# Patient Record
Sex: FEMALE | Race: WHITE | URBAN - NONMETROPOLITAN AREA
[De-identification: names, ages, dates, MRNs, and addresses within clinical notes are randomized per-mention and may not be internally consistent; named-entity substitution may affect disease eponyms.]

---

## 2017-01-12 RX ORDER — TOPIRAMATE 100 MG/1
TABLET, FILM COATED ORAL
Qty: 60 TABLET | Refills: 1 | Status: SHIPPED | OUTPATIENT
Start: 2017-01-12 | End: 2017-04-02 | Stop reason: SDUPTHER

## 2017-01-16 ENCOUNTER — TELEPHONE (OUTPATIENT)
Dept: NEUROLOGY | Age: 47
End: 2017-01-16

## 2017-02-07 RX ORDER — SUMATRIPTAN 100 MG/1
TABLET, FILM COATED ORAL
Qty: 12 TABLET | Refills: 2 | Status: SHIPPED | OUTPATIENT
Start: 2017-02-07 | End: 2017-03-21 | Stop reason: SDUPTHER

## 2017-03-27 RX ORDER — SUMATRIPTAN 100 MG/1
TABLET, FILM COATED ORAL
Qty: 12 TABLET | Refills: 2 | Status: SHIPPED | OUTPATIENT
Start: 2017-03-27

## 2017-03-27 RX ORDER — SUMATRIPTAN 100 MG/1
TABLET, FILM COATED ORAL
Qty: 12 TABLET | Refills: 0 | Status: SHIPPED | OUTPATIENT
Start: 2017-03-27 | End: 2017-07-13 | Stop reason: SDUPTHER

## 2017-04-03 RX ORDER — TOPIRAMATE 100 MG/1
TABLET, FILM COATED ORAL
Qty: 60 TABLET | Refills: 0 | Status: SHIPPED | OUTPATIENT
Start: 2017-04-03 | End: 2017-07-13 | Stop reason: SDUPTHER

## 2017-04-17 ENCOUNTER — OFFICE VISIT (OUTPATIENT)
Dept: NEUROLOGY | Age: 47
End: 2017-04-17
Payer: COMMERCIAL

## 2017-04-17 VITALS
WEIGHT: 200 LBS | SYSTOLIC BLOOD PRESSURE: 114 MMHG | BODY MASS INDEX: 30.31 KG/M2 | DIASTOLIC BLOOD PRESSURE: 70 MMHG | HEIGHT: 68 IN | HEART RATE: 80 BPM

## 2017-04-17 DIAGNOSIS — E03.9 HYPOTHYROIDISM, UNSPECIFIED TYPE: ICD-10-CM

## 2017-04-17 DIAGNOSIS — G43.909 MIGRAINE WITHOUT STATUS MIGRAINOSUS, NOT INTRACTABLE, UNSPECIFIED MIGRAINE TYPE: ICD-10-CM

## 2017-04-17 DIAGNOSIS — R47.9 SPEECH PROBLEM: ICD-10-CM

## 2017-04-17 DIAGNOSIS — R42 DIZZINESS: ICD-10-CM

## 2017-04-17 DIAGNOSIS — I63.50 UNSPECIFIED CEREBRAL ARTERY OCCLUSION WITH CEREBRAL INFARCTION: ICD-10-CM

## 2017-04-17 DIAGNOSIS — G89.29 CHRONIC NECK AND BACK PAIN: Primary | ICD-10-CM

## 2017-04-17 DIAGNOSIS — N30.10 INTERSTITIAL CYSTITIS: ICD-10-CM

## 2017-04-17 DIAGNOSIS — D68.61 ANTI-CARDIOLIPIN ANTIBODY SYNDROME (HCC): ICD-10-CM

## 2017-04-17 DIAGNOSIS — M54.9 CHRONIC NECK AND BACK PAIN: Primary | ICD-10-CM

## 2017-04-17 DIAGNOSIS — N80.9 ENDOMETRIOSIS: ICD-10-CM

## 2017-04-17 DIAGNOSIS — A69.20 LYME DISEASE: ICD-10-CM

## 2017-04-17 DIAGNOSIS — R26.89 BALANCE PROBLEM: ICD-10-CM

## 2017-04-17 DIAGNOSIS — G45.9 TRANSIENT CEREBRAL ISCHEMIA, UNSPECIFIED TYPE: ICD-10-CM

## 2017-04-17 DIAGNOSIS — G93.0 INTRACRANIAL ARACHNOID CYST: ICD-10-CM

## 2017-04-17 DIAGNOSIS — M54.2 CHRONIC NECK AND BACK PAIN: Primary | ICD-10-CM

## 2017-04-17 DIAGNOSIS — G44.1 OTHER VASCULAR HEADACHE: ICD-10-CM

## 2017-04-17 DIAGNOSIS — E07.9 THYROID DISEASE: ICD-10-CM

## 2017-04-17 DIAGNOSIS — M54.2 CERVICALGIA: ICD-10-CM

## 2017-04-17 DIAGNOSIS — R56.9 CONVULSIONS, UNSPECIFIED CONVULSION TYPE (HCC): ICD-10-CM

## 2017-04-17 DIAGNOSIS — G43.001 MIGRAINE WITHOUT AURA AND WITH STATUS MIGRAINOSUS, NOT INTRACTABLE: ICD-10-CM

## 2017-04-17 DIAGNOSIS — G44.209 TENSION-TYPE HEADACHE, NOT INTRACTABLE, UNSPECIFIED CHRONICITY PATTERN: ICD-10-CM

## 2017-04-17 DIAGNOSIS — M54.2 CERVICAL PAIN: ICD-10-CM

## 2017-04-17 DIAGNOSIS — K22.70 BARRETT'S ESOPHAGUS WITHOUT DYSPLASIA: ICD-10-CM

## 2017-04-17 DIAGNOSIS — F32.89 OTHER DEPRESSION: ICD-10-CM

## 2017-04-17 DIAGNOSIS — Z84.89: ICD-10-CM

## 2017-04-17 DIAGNOSIS — E06.3 HASHIMOTO THYROIDITIS, FIBROUS VARIANT: ICD-10-CM

## 2017-04-17 DIAGNOSIS — G47.9 SLEEP DISTURBANCE: ICD-10-CM

## 2017-04-17 DIAGNOSIS — R41.3 MEMORY PROBLEM: ICD-10-CM

## 2017-04-17 DIAGNOSIS — F17.200 SMOKER: ICD-10-CM

## 2017-04-17 PROCEDURE — 99215 OFFICE O/P EST HI 40 MIN: CPT | Performed by: PSYCHIATRY & NEUROLOGY

## 2017-04-17 RX ORDER — GABAPENTIN 100 MG/1
CAPSULE ORAL
Qty: 90 CAPSULE | Refills: 0 | Status: SHIPPED | OUTPATIENT
Start: 2017-04-17 | End: 2017-07-13 | Stop reason: ALTCHOICE

## 2017-04-17 ASSESSMENT — ENCOUNTER SYMPTOMS
FACIAL SWELLING: 0
BLOOD IN STOOL: 0
EYE ITCHING: 0
DIARRHEA: 0
CONSTIPATION: 0
EYE DISCHARGE: 0
COLOR CHANGE: 0
TROUBLE SWALLOWING: 0
ABDOMINAL DISTENTION: 0
CHOKING: 0
CHEST TIGHTNESS: 0
SHORTNESS OF BREATH: 0
WHEEZING: 0
VOICE CHANGE: 0
APNEA: 0

## 2017-05-08 RX ORDER — TOPIRAMATE 100 MG/1
TABLET, FILM COATED ORAL
Qty: 60 TABLET | Refills: 2 | Status: SHIPPED | OUTPATIENT
Start: 2017-05-08

## 2017-06-28 ENCOUNTER — OFFICE VISIT (OUTPATIENT)
Dept: NEUROLOGY | Age: 47
End: 2017-06-28
Payer: COMMERCIAL

## 2017-06-28 DIAGNOSIS — G57.40 LESION OF MEDIAL POPLITEAL NERVE, UNSPECIFIED LATERALITY: Primary | ICD-10-CM

## 2017-06-28 DIAGNOSIS — M54.17 LUMBOSACRAL RADICULOPATHY: ICD-10-CM

## 2017-06-28 DIAGNOSIS — R53.1 WEAKNESS: ICD-10-CM

## 2017-06-28 DIAGNOSIS — M79.605 PAIN IN LEFT LEG: ICD-10-CM

## 2017-06-28 DIAGNOSIS — R26.9 GAIT ABNORMALITY: ICD-10-CM

## 2017-06-28 DIAGNOSIS — G62.9 POLYNEUROPATHY: ICD-10-CM

## 2017-06-28 DIAGNOSIS — M62.838 MUSCLE SPASM: ICD-10-CM

## 2017-06-28 DIAGNOSIS — R26.81 UNSTEADINESS: ICD-10-CM

## 2017-06-28 DIAGNOSIS — M79.604 PAIN IN RIGHT LEG: ICD-10-CM

## 2017-06-28 DIAGNOSIS — M54.5 BILATERAL LOW BACK PAIN, UNSPECIFIED CHRONICITY, WITH SCIATICA PRESENCE UNSPECIFIED: ICD-10-CM

## 2017-06-28 DIAGNOSIS — R25.2 CRAMP AND SPASM: ICD-10-CM

## 2017-06-28 DIAGNOSIS — R20.2 PARESTHESIA: ICD-10-CM

## 2017-06-28 PROCEDURE — 95886 MUSC TEST DONE W/N TEST COMP: CPT | Performed by: PSYCHIATRY & NEUROLOGY

## 2017-06-28 PROCEDURE — 95911 NRV CNDJ TEST 9-10 STUDIES: CPT | Performed by: PSYCHIATRY & NEUROLOGY

## 2017-09-28 ENCOUNTER — OFFICE VISIT (OUTPATIENT)
Dept: NEUROLOGY | Age: 47
End: 2017-09-28
Payer: COMMERCIAL

## 2017-09-28 VITALS
HEIGHT: 68 IN | DIASTOLIC BLOOD PRESSURE: 72 MMHG | BODY MASS INDEX: 33.35 KG/M2 | OXYGEN SATURATION: 94 % | SYSTOLIC BLOOD PRESSURE: 126 MMHG | WEIGHT: 220.02 LBS | HEART RATE: 78 BPM

## 2017-09-28 DIAGNOSIS — M54.2 CERVICALGIA: ICD-10-CM

## 2017-09-28 DIAGNOSIS — A69.20 LYME DISEASE: ICD-10-CM

## 2017-09-28 DIAGNOSIS — M54.17 LUMBOSACRAL RADICULOPATHY: ICD-10-CM

## 2017-09-28 DIAGNOSIS — G43.009 MIGRAINE WITHOUT AURA AND WITHOUT STATUS MIGRAINOSUS, NOT INTRACTABLE: Primary | ICD-10-CM

## 2017-09-28 DIAGNOSIS — D68.61 ANTI-CARDIOLIPIN ANTIBODY SYNDROME (HCC): ICD-10-CM

## 2017-09-28 DIAGNOSIS — R47.9 SPEECH PROBLEM: ICD-10-CM

## 2017-09-28 DIAGNOSIS — M54.2 CERVICAL PAIN: ICD-10-CM

## 2017-09-28 DIAGNOSIS — M79.604 PAIN IN RIGHT LEG: ICD-10-CM

## 2017-09-28 DIAGNOSIS — G47.9 SLEEP DISTURBANCE: ICD-10-CM

## 2017-09-28 DIAGNOSIS — E07.9 THYROID DISEASE: ICD-10-CM

## 2017-09-28 DIAGNOSIS — M54.40 LOW BACK PAIN WITH SCIATICA, SCIATICA LATERALITY UNSPECIFIED, UNSPECIFIED BACK PAIN LATERALITY, UNSPECIFIED CHRONICITY: ICD-10-CM

## 2017-09-28 DIAGNOSIS — M54.2 CHRONIC NECK AND BACK PAIN: ICD-10-CM

## 2017-09-28 DIAGNOSIS — R26.9 GAIT ABNORMALITY: ICD-10-CM

## 2017-09-28 DIAGNOSIS — G62.9 POLYNEUROPATHY: ICD-10-CM

## 2017-09-28 DIAGNOSIS — G44.039 NONINTRACTABLE PAROXYSMAL HEMICRANIA, UNSPECIFIED CHRONICITY PATTERN: ICD-10-CM

## 2017-09-28 DIAGNOSIS — Z84.89: ICD-10-CM

## 2017-09-28 DIAGNOSIS — E06.3 HASHIMOTO THYROIDITIS, FIBROUS VARIANT: ICD-10-CM

## 2017-09-28 DIAGNOSIS — G89.29 CHRONIC NECK AND BACK PAIN: ICD-10-CM

## 2017-09-28 DIAGNOSIS — R41.3 MEMORY PROBLEM: ICD-10-CM

## 2017-09-28 DIAGNOSIS — M62.838 MUSCLE SPASM: ICD-10-CM

## 2017-09-28 DIAGNOSIS — R42 DIZZINESS: ICD-10-CM

## 2017-09-28 DIAGNOSIS — F32.A DEPRESSION, UNSPECIFIED DEPRESSION TYPE: ICD-10-CM

## 2017-09-28 DIAGNOSIS — I63.50 CEREBRAL ARTERY OCCLUSION WITH CEREBRAL INFARCTION (HCC): ICD-10-CM

## 2017-09-28 DIAGNOSIS — G93.0 INTRACRANIAL ARACHNOID CYST: ICD-10-CM

## 2017-09-28 DIAGNOSIS — F17.200 SMOKER: ICD-10-CM

## 2017-09-28 DIAGNOSIS — M54.5 BILATERAL LOW BACK PAIN, UNSPECIFIED CHRONICITY, WITH SCIATICA PRESENCE UNSPECIFIED: ICD-10-CM

## 2017-09-28 DIAGNOSIS — R26.89 BALANCE PROBLEM: ICD-10-CM

## 2017-09-28 DIAGNOSIS — M54.9 CHRONIC NECK AND BACK PAIN: ICD-10-CM

## 2017-09-28 PROCEDURE — 99215 OFFICE O/P EST HI 40 MIN: CPT | Performed by: PSYCHIATRY & NEUROLOGY

## 2017-09-28 ASSESSMENT — ENCOUNTER SYMPTOMS
SCALP TENDERNESS: 0
ABDOMINAL PAIN: 0
NAUSEA: 1
EYE PAIN: 0
SWOLLEN GLANDS: 0
VISUAL CHANGE: 0
BOWEL INCONTINENCE: 0
FACIAL SWEATING: 0
BLURRED VISION: 0
EYE ITCHING: 0
VOMITING: 0
TROUBLE SWALLOWING: 0
ABDOMINAL DISTENTION: 0
BLOOD IN STOOL: 0
CHOKING: 0
PHOTOPHOBIA: 1
SORE THROAT: 0
SINUS PRESSURE: 0
APNEA: 0
FACIAL SWELLING: 0
RHINORRHEA: 0
BACK PAIN: 1
SHORTNESS OF BREATH: 0
VOICE CHANGE: 0
CHEST TIGHTNESS: 0
EYE REDNESS: 0
DIARRHEA: 0
EYE DISCHARGE: 0
EYE WATERING: 0
COLOR CHANGE: 0
CONSTIPATION: 0
WHEEZING: 0

## 2019-09-26 ENCOUNTER — OFFICE VISIT (OUTPATIENT)
Dept: PAIN MANAGEMENT | Age: 49
End: 2019-09-26
Payer: COMMERCIAL

## 2019-09-26 VITALS
WEIGHT: 209 LBS | SYSTOLIC BLOOD PRESSURE: 115 MMHG | HEIGHT: 69 IN | DIASTOLIC BLOOD PRESSURE: 70 MMHG | BODY MASS INDEX: 30.96 KG/M2 | RESPIRATION RATE: 16 BRPM

## 2019-09-26 DIAGNOSIS — Z98.1 STATUS POST LAMINECTOMY WITH SPINAL FUSION: ICD-10-CM

## 2019-09-26 DIAGNOSIS — M54.12 CERVICAL RADICULOPATHY: ICD-10-CM

## 2019-09-26 DIAGNOSIS — F41.8 DEPRESSION WITH ANXIETY: ICD-10-CM

## 2019-09-26 DIAGNOSIS — M47.816 LUMBAR FACET JOINT SYNDROME: ICD-10-CM

## 2019-09-26 DIAGNOSIS — M47.812 CERVICAL FACET SYNDROME: ICD-10-CM

## 2019-09-26 DIAGNOSIS — M54.16 LUMBAR RADICULOPATHY: Primary | ICD-10-CM

## 2019-09-26 DIAGNOSIS — G89.4 CHRONIC PAIN SYNDROME: ICD-10-CM

## 2019-09-26 PROCEDURE — 99204 OFFICE O/P NEW MOD 45 MIN: CPT | Performed by: PHYSICAL MEDICINE & REHABILITATION

## 2019-09-26 RX ORDER — METHYLPREDNISOLONE 4 MG/1
2 TABLET ORAL PRN
COMMUNITY

## 2019-09-26 RX ORDER — MORPHINE SULFATE 30 MG/1
30 CAPSULE, EXTENDED RELEASE ORAL 2 TIMES DAILY
COMMUNITY

## 2019-09-26 RX ORDER — GABAPENTIN 600 MG/1
600 TABLET ORAL 2 TIMES DAILY
COMMUNITY

## 2019-09-26 RX ORDER — AMITRIPTYLINE HYDROCHLORIDE 25 MG/1
25 TABLET, FILM COATED ORAL NIGHTLY
COMMUNITY

## 2019-09-26 RX ORDER — LANSOPRAZOLE 30 MG/1
30 CAPSULE, DELAYED RELEASE ORAL DAILY
COMMUNITY

## 2019-09-26 RX ORDER — OXYCODONE AND ACETAMINOPHEN 10; 325 MG/1; MG/1
1 TABLET ORAL EVERY 4 HOURS PRN
COMMUNITY

## 2019-09-26 RX ORDER — CYCLOBENZAPRINE HCL 10 MG
10 TABLET ORAL 3 TIMES DAILY PRN
COMMUNITY

## 2019-09-26 RX ORDER — FLUOXETINE HYDROCHLORIDE 20 MG/1
20 CAPSULE ORAL DAILY
COMMUNITY

## 2019-09-26 RX ORDER — DULOXETIN HYDROCHLORIDE 60 MG/1
60 CAPSULE, DELAYED RELEASE ORAL DAILY
COMMUNITY

## 2019-09-26 RX ORDER — KETOROLAC TROMETHAMINE 10 MG/1
10 TABLET, FILM COATED ORAL 3 TIMES DAILY
COMMUNITY

## 2019-09-26 SDOH — HEALTH STABILITY: MENTAL HEALTH: HOW OFTEN DO YOU HAVE A DRINK CONTAINING ALCOHOL?: NEVER

## 2019-09-26 ASSESSMENT — ENCOUNTER SYMPTOMS
ALLERGIC/IMMUNOLOGIC NEGATIVE: 1
BACK PAIN: 1
EYES NEGATIVE: 1
DIARRHEA: 0
RESPIRATORY NEGATIVE: 1
CONSTIPATION: 0

## 2019-09-26 NOTE — PROGRESS NOTES
(REMERON SOLTAB) 45 MG disintegrating tablet Take 45 mg by mouth nightly.  acyclovir (ZOVIRAX) 800 MG tablet Take 800 mg by mouth 3 times daily.  lansoprazole (PREVACID SOLUTAB) 15 MG disintegrating tablet Take 15 mg by mouth daily. No facility-administered medications prior to visit.         Past Medical History:   Diagnosis Date    Anti-cardiolipin antibody syndrome (HCC)     Anxiety     Asthma     Balance problem     Diop's esophagus     Cervical pain     Chronic abdominal pain     Depression     Dizziness     Endometriosis     FH: multiple miscarriages or stillbirths     Hashimoto thyroiditis, fibrous variant     Headache     Hypothyroidism     Interstitial cystitis     Interstitial cystitis     Intracranial arachnoid cyst     Joint pain     Lumbar back pain     Lyme disease     Memory problem     Migraine     Muscle pain     Sleep disturbance     Smoker     Thyroid disease     TIA (transient ischemic attack) 2003    Unspecified cerebral artery occlusion with cerebral infarction        Past Surgical History:   Procedure Laterality Date    BACK SURGERY      LUMBAR L5-S1    CERVICAL DISC SURGERY      CHOLECYSTECTOMY      ECTOPIC PREGNANCY SURGERY      KNEE ARTHROSCOPY Right     ACL RELEASE    NERVE BLOCK  4/15/13    Cervical Epidural, [facility] 9mg    NERVE BLOCK  4/30/13    Cervical Epidural, Celestone 6mg    SALPINGO-OOPHORECTOMY Right     TONSILLECTOMY         Family History   Problem Relation Age of Onset    Other Mother     High Blood Pressure Mother     Other Brother      Social History     Socioeconomic History    Marital status:      Spouse name: None    Number of children: None    Years of education: None    Highest education level: None   Occupational History    None   Social Needs    Financial resource strain: None    Food insecurity:     Worry: None     Inability: None    Transportation needs:     Medical: None     Non-medical: None   Tobacco Use    Smoking status: Current Every Day Smoker     Packs/day: 1.00    Smokeless tobacco: Never Used   Substance and Sexual Activity    Alcohol use: Never     Frequency: Never    Drug use: No    Sexual activity: None   Lifestyle    Physical activity:     Days per week: None     Minutes per session: None    Stress: None   Relationships    Social connections:     Talks on phone: None     Gets together: None     Attends Spiritism service: None     Active member of club or organization: None     Attends meetings of clubs or organizations: None     Relationship status: None    Intimate partner violence:     Fear of current or ex partner: None     Emotionally abused: None     Physically abused: None     Forced sexual activity: None   Other Topics Concern    None   Social History Narrative    None         Objective:     Physical Exam:  Vitals:    09/26/19 1030   BP: 115/70   Site: Right Upper Arm   Position: Sitting   Cuff Size: Large Adult   Resp: 16   Weight: 209 lb (94.8 kg)   Height: 5' 9.02\" (1.753 m)     Pain Score:   7    Physical Exam   Constitutional: She is oriented to person, place, and time. She appears well-developed and well-nourished. No distress. HENT:   Head: Normocephalic and atraumatic. Right Ear: External ear normal. No decreased hearing is noted. Left Ear: External ear normal. No decreased hearing is noted. Nose: Nose normal.   Mouth/Throat: Oropharynx is clear and moist and mucous membranes are normal.   Eyes: Conjunctivae and lids are normal. No scleral icterus. Neck: Phonation normal.   Cardiovascular:   No BLE edema present   Pulmonary/Chest: Effort normal. No accessory muscle usage. No respiratory distress. Abdominal: Normal appearance. Genitourinary:   Genitourinary Comments: deferred   Neurological: She is alert and oriented to person, place, and time. Skin: Skin is warm, dry and intact. No rash noted. She is not diaphoretic. No cyanosis or erythema.  No pallor. Psychiatric: She has a normal mood and affect. Her speech is normal and behavior is normal.       Right Ankle Exam     Range of Motion   The patient has normal right ankle ROM. Comments:   FROM  Knees hips ankles      Back Exam     Tenderness   The patient is experiencing tenderness in the cervical, lumbar and thoracic. Range of Motion   Extension: abnormal   Flexion: abnormal   Lateral bend right: abnormal   Lateral bend left: abnormal   Rotation right: normal   Rotation left: normal     Muscle Strength   Right Quadriceps:  5/5   Left Quadriceps:  5/5   Right Hamstrings:  5/5   Left Hamstrings:  5/5     Tests   Straight leg raise right: positive  Straight leg raise left: positive    Other   Toe walk: normal  Heel walk: normal  Sensation: normal  Gait: normal     Comments:  +facet B  + Spurlings L not right    Limited range of motion  L neck     +Kemps   +slump   - fabers        Right Hand Exam   Right hand exam is normal.      Left Hand Exam   Left hand exam is normal.             Labs:   Lab Results   Component Value Date    WBC 11.3 (H) 05/23/2016    HGB 14.9 05/23/2016    HCT 45.4 05/23/2016     05/23/2016    ALT 32 05/23/2016    AST 19 05/23/2016     05/23/2016    K 4.0 05/23/2016     05/23/2016    CREATININE 0.80 05/23/2016    BUN 8 05/23/2016    CO2 26 05/23/2016    TSH 1.18 05/23/2016       Assessment: This is a 52 y.o. female with the following diagnosis:     Pain Diagnoses:  1. Lumbar radiculopathy    2. Cervical radiculopathy    3. Lumbar facet joint syndrome    4. Cervical facet syndrome    5. Depression with anxiety    6. Chronic pain syndrome    7.  Status post laminectomy with spinal fusion        Medical/ Psychological Comorbidities:  As listed in the past medical and surgical history    Functional Limitations secondary to the above problems:  Chronic painlimits function and quality of life    Plan:   Anxiety Depression   Diagnosis precludes  Opioid